# Patient Record
Sex: FEMALE | Race: ASIAN | NOT HISPANIC OR LATINO | URBAN - METROPOLITAN AREA
[De-identification: names, ages, dates, MRNs, and addresses within clinical notes are randomized per-mention and may not be internally consistent; named-entity substitution may affect disease eponyms.]

---

## 2019-02-06 ENCOUNTER — INPATIENT (INPATIENT)
Facility: HOSPITAL | Age: 84
LOS: 0 days | Discharge: HOME | DRG: 690 | End: 2019-02-07
Attending: INTERNAL MEDICINE | Admitting: INTERNAL MEDICINE
Payer: COMMERCIAL

## 2019-02-06 VITALS
TEMPERATURE: 99 F | WEIGHT: 139.99 LBS | SYSTOLIC BLOOD PRESSURE: 149 MMHG | RESPIRATION RATE: 16 BRPM | OXYGEN SATURATION: 97 % | HEART RATE: 87 BPM | DIASTOLIC BLOOD PRESSURE: 81 MMHG

## 2019-02-06 LAB
ALBUMIN SERPL ELPH-MCNC: 4.2 G/DL — SIGNIFICANT CHANGE UP (ref 3.3–5)
ALP SERPL-CCNC: 45 U/L — SIGNIFICANT CHANGE UP (ref 40–120)
ALT FLD-CCNC: 13 U/L — SIGNIFICANT CHANGE UP (ref 10–45)
ANION GAP SERPL CALC-SCNC: 12 MMOL/L — SIGNIFICANT CHANGE UP (ref 5–17)
APPEARANCE UR: CLEAR — SIGNIFICANT CHANGE UP
AST SERPL-CCNC: 15 U/L — SIGNIFICANT CHANGE UP (ref 10–40)
BASOPHILS # BLD AUTO: 0.04 K/UL — SIGNIFICANT CHANGE UP (ref 0–0.2)
BASOPHILS NFR BLD AUTO: 0.5 % — SIGNIFICANT CHANGE UP (ref 0–2)
BILIRUB SERPL-MCNC: 0.2 MG/DL — SIGNIFICANT CHANGE UP (ref 0.2–1.2)
BILIRUB UR-MCNC: NEGATIVE — SIGNIFICANT CHANGE UP
BUN SERPL-MCNC: 21 MG/DL — SIGNIFICANT CHANGE UP (ref 7–23)
CALCIUM SERPL-MCNC: 9.1 MG/DL — SIGNIFICANT CHANGE UP (ref 8.4–10.5)
CHLORIDE SERPL-SCNC: 107 MMOL/L — SIGNIFICANT CHANGE UP (ref 96–108)
CO2 SERPL-SCNC: 24 MMOL/L — SIGNIFICANT CHANGE UP (ref 22–31)
COLOR SPEC: YELLOW — SIGNIFICANT CHANGE UP
CREAT SERPL-MCNC: 0.81 MG/DL — SIGNIFICANT CHANGE UP (ref 0.5–1.3)
DIFF PNL FLD: NEGATIVE — SIGNIFICANT CHANGE UP
EOSINOPHIL # BLD AUTO: 0.07 K/UL — SIGNIFICANT CHANGE UP (ref 0–0.5)
EOSINOPHIL NFR BLD AUTO: 0.9 % — SIGNIFICANT CHANGE UP (ref 0–6)
GLUCOSE SERPL-MCNC: 115 MG/DL — HIGH (ref 70–99)
GLUCOSE UR QL: NEGATIVE — SIGNIFICANT CHANGE UP
HCT VFR BLD CALC: 36.4 % — SIGNIFICANT CHANGE UP (ref 34.5–45)
HGB BLD-MCNC: 11.7 G/DL — SIGNIFICANT CHANGE UP (ref 11.5–15.5)
IMM GRANULOCYTES NFR BLD AUTO: 0.4 % — SIGNIFICANT CHANGE UP (ref 0–1.5)
KETONES UR-MCNC: NEGATIVE — SIGNIFICANT CHANGE UP
LEUKOCYTE ESTERASE UR-ACNC: ABNORMAL
LYMPHOCYTES # BLD AUTO: 1.02 K/UL — SIGNIFICANT CHANGE UP (ref 1–3.3)
LYMPHOCYTES # BLD AUTO: 12.8 % — LOW (ref 13–44)
MCHC RBC-ENTMCNC: 32.1 GM/DL — SIGNIFICANT CHANGE UP (ref 32–36)
MCHC RBC-ENTMCNC: 34.8 PG — HIGH (ref 27–34)
MCV RBC AUTO: 108.3 FL — HIGH (ref 80–100)
MONOCYTES # BLD AUTO: 0.65 K/UL — SIGNIFICANT CHANGE UP (ref 0–0.9)
MONOCYTES NFR BLD AUTO: 8.2 % — SIGNIFICANT CHANGE UP (ref 2–14)
NEUTROPHILS # BLD AUTO: 6.15 K/UL — SIGNIFICANT CHANGE UP (ref 1.8–7.4)
NEUTROPHILS NFR BLD AUTO: 77.2 % — HIGH (ref 43–77)
NITRITE UR-MCNC: NEGATIVE — SIGNIFICANT CHANGE UP
PH UR: 7 — SIGNIFICANT CHANGE UP (ref 5–8)
PLATELET # BLD AUTO: 195 K/UL — SIGNIFICANT CHANGE UP (ref 150–400)
POTASSIUM SERPL-MCNC: 5.2 MMOL/L — SIGNIFICANT CHANGE UP (ref 3.5–5.3)
POTASSIUM SERPL-SCNC: 5.2 MMOL/L — SIGNIFICANT CHANGE UP (ref 3.5–5.3)
PROT SERPL-MCNC: 7.1 G/DL — SIGNIFICANT CHANGE UP (ref 6–8.3)
PROT UR-MCNC: NEGATIVE MG/DL — SIGNIFICANT CHANGE UP
RBC # BLD: 3.36 M/UL — LOW (ref 3.8–5.2)
RBC # FLD: 12.4 % — SIGNIFICANT CHANGE UP (ref 10.3–14.5)
SODIUM SERPL-SCNC: 143 MMOL/L — SIGNIFICANT CHANGE UP (ref 135–145)
SP GR SPEC: 1.01 — SIGNIFICANT CHANGE UP (ref 1–1.03)
UROBILINOGEN FLD QL: 0.2 E.U./DL — SIGNIFICANT CHANGE UP
WBC # BLD: 7.96 K/UL — SIGNIFICANT CHANGE UP (ref 3.8–10.5)
WBC # FLD AUTO: 7.96 K/UL — SIGNIFICANT CHANGE UP (ref 3.8–10.5)

## 2019-02-06 PROCEDURE — 99285 EMERGENCY DEPT VISIT HI MDM: CPT

## 2019-02-06 PROCEDURE — 74177 CT ABD & PELVIS W/CONTRAST: CPT | Mod: 26

## 2019-02-06 RX ORDER — ONDANSETRON 8 MG/1
4 TABLET, FILM COATED ORAL ONCE
Qty: 0 | Refills: 0 | Status: COMPLETED | OUTPATIENT
Start: 2019-02-06 | End: 2019-02-06

## 2019-02-06 RX ORDER — SODIUM CHLORIDE 9 MG/ML
1000 INJECTION INTRAMUSCULAR; INTRAVENOUS; SUBCUTANEOUS ONCE
Qty: 0 | Refills: 0 | Status: COMPLETED | OUTPATIENT
Start: 2019-02-06 | End: 2019-02-06

## 2019-02-06 RX ORDER — FAMOTIDINE 10 MG/ML
20 INJECTION INTRAVENOUS ONCE
Qty: 0 | Refills: 0 | Status: COMPLETED | OUTPATIENT
Start: 2019-02-06 | End: 2019-02-06

## 2019-02-06 RX ORDER — IOHEXOL 300 MG/ML
30 INJECTION, SOLUTION INTRAVENOUS ONCE
Qty: 0 | Refills: 0 | Status: COMPLETED | OUTPATIENT
Start: 2019-02-06 | End: 2019-02-06

## 2019-02-06 RX ORDER — KETOROLAC TROMETHAMINE 30 MG/ML
15 SYRINGE (ML) INJECTION ONCE
Qty: 0 | Refills: 0 | Status: DISCONTINUED | OUTPATIENT
Start: 2019-02-06 | End: 2019-02-06

## 2019-02-06 RX ADMIN — SODIUM CHLORIDE 1000 MILLILITER(S): 9 INJECTION INTRAMUSCULAR; INTRAVENOUS; SUBCUTANEOUS at 20:43

## 2019-02-06 RX ADMIN — IOHEXOL 30 MILLILITER(S): 300 INJECTION, SOLUTION INTRAVENOUS at 18:49

## 2019-02-06 RX ADMIN — Medication 15 MILLIGRAM(S): at 20:43

## 2019-02-06 RX ADMIN — Medication 15 MILLIGRAM(S): at 18:49

## 2019-02-06 RX ADMIN — FAMOTIDINE 20 MILLIGRAM(S): 10 INJECTION INTRAVENOUS at 18:49

## 2019-02-06 RX ADMIN — ONDANSETRON 4 MILLIGRAM(S): 8 TABLET, FILM COATED ORAL at 18:49

## 2019-02-06 RX ADMIN — SODIUM CHLORIDE 1000 MILLILITER(S): 9 INJECTION INTRAMUSCULAR; INTRAVENOUS; SUBCUTANEOUS at 18:49

## 2019-02-06 NOTE — CONSULT NOTE ADULT - SUBJECTIVE AND OBJECTIVE BOX
HPI:  85 F HTN, HLD, Hypothyroid PSHx R renal tumor excision, hysterectomy presents with sharp abdominal pain x2D. Pain has been constant sharp worse in epigastric x2 days not associated with food and waking her in the night once. Pain was improved with manisha-seltzer. Denies N/V, F/C, CP, SOB,. Is passing flatus with last BM today.           REVIEW OF SYSTEMS  Pertinent findings discussed above    MEDICATIONS  (PRN):      Allergies    Benadryl (Other)  Darvon (Other)  Demerol HCl (Other)  Detrol (Unknown)  Lipitor (Unknown)  oxycodone (Other)  penicillin (Anaphylaxis; Other)  shellfish (Unknown)  sulfa drugs (Hives)    Intolerances        SOCIAL HISTORY:    FAMILY HISTORY:      Vital Signs Last 24 Hrs  T(C): 37 (2019 22:32), Max: 37.1 (2019 16:53)  T(F): 98.6 (2019 22:32), Max: 98.8 (2019 16:53)  HR: 79 (2019 22:32) (77 - 87)  BP: 132/74 (2019 22:32) (132/74 - 149/81)  BP(mean): --  RR: 16 (2019 22:32) (16 - 16)  SpO2: 97% (2019 22:32) (97% - 97%)    PHYSICAL EXAM:  General: A/O x4 NAD  ABD: Obese, softly distended        LABS:                        11.7   7.96  )-----------( 195      ( 2019 18:02 )             36.4     02-06    143  |  107  |  21  ----------------------------<  115<H>  5.2   |  24  |  0.81    Ca    9.1      2019 18:02    TPro  7.1  /  Alb  4.2  /  TBili  0.2  /  DBili  x   /  AST  15  /  ALT  13  /  AlkPhos  45  02-06      Urinalysis Basic - ( 2019 20:48 )    Color: Yellow / Appearance: Clear / S.015 / pH: x  Gluc: x / Ketone: NEGATIVE  / Bili: Negative / Urobili: 0.2 E.U./dL   Blood: x / Protein: NEGATIVE mg/dL / Nitrite: NEGATIVE   Leuk Esterase: Small / RBC: < 5 /HPF / WBC > 10 /HPF   Sq Epi: x / Non Sq Epi: 0-5 /HPF / Bacteria: Present /HPF        RADIOLOGY & ADDITIONAL STUDIES:

## 2019-02-06 NOTE — ED ADULT TRIAGE NOTE - CHIEF COMPLAINT QUOTE
Pt referred to ED by Dr Lawton with c/o generalized sharp abdominal pain and nausea x two days. Denies diarrhea, fevers, chills.

## 2019-02-06 NOTE — CONSULT NOTE ADULT - ASSESSMENT
85 F HTN, HLD, Hypothyroid PSHx R renal tumor excision, hysterectomy presents with sharp abdominal pain x2D. CT shows Small fat containing umbilical hernia and circumferential thickening of antrum    Recommend O/P GI follow up.   Can see Dr Lopez in his clinic with in 1 week  Discussed with Dr Lopez

## 2019-02-06 NOTE — ED ADULT NURSE NOTE - NSIMPLEMENTINTERV_GEN_ALL_ED
Implemented All Universal Safety Interventions:  [place] to call system. Call bell, personal items and telephone within reach. Instruct patient to call for assistance. Room bathroom lighting operational. Non-slip footwear when patient is off stretcher. Physically safe environment: no spills, clutter or unnecessary equipment. Stretcher in lowest position, wheels locked, appropriate side rails in place.

## 2019-02-06 NOTE — ED PROVIDER NOTE - PHYSICAL EXAMINATION
VITAL SIGNS: I have reviewed nursing notes and confirm.  CONSTITUTIONAL: Well-developed; well-nourished elderly female, talkative in stretcher, A&OX3 speaking clearly in complete sentences, no acute distress.  SKIN: Agree with RN documentation regarding decubitus evaluation. Remainder of skin exam is warm and dry, no acute rash.  HEAD: Normocephalic; atraumatic.  EYES: PERRL, EOM intact; conjunctiva and sclera clear.  ENT: No nasal discharge; airway clear.  NECK: Supple; non tender.  CARD: S1, S2 normal; no murmurs, gallops, or rubs. RRR  RESP: No wheezes, rales or rhonchi. CTA w/good excursion, no inc wob  ABD: Normal bowel sounds; soft; + TTP diffusely, no rigidity/guarding  : No CVA TTP b/l  EXT: Normal ROM. No clubbing, cyanosis or edema.  LYMPH: No acute cervical adenopathy.  NEURO: Alert, oriented. Grossly unremarkable.  PSYCH: Cooperative, appropriate.

## 2019-02-06 NOTE — ED PROVIDER NOTE - OBJECTIVE STATEMENT
86 y/o F w/pshx total hysterectomy, partial R sided nephrectomy for renal ca, p/w diffuse sharp generalized abd pain worsening for past 2 days w/nausea, no vomiting. Normal BMs, no brbpr or melena. No fever/chills. No CP/SOB/palpitations. Denies association with eating. No hx gallstones. No urinary sx or back/flank pain. Never had similar pain in the past. Went to see PMD Dr. Lawton in the office, found to be diffusely TTP, referred to ED for evaluation.

## 2019-02-06 NOTE — ED ADULT NURSE NOTE - CHPI ED NUR SYMPTOMS NEG
no blood in stool/no burning urination/no diarrhea/no vomiting/no abdominal distension/no dysuria/no chills

## 2019-02-06 NOTE — ED PROVIDER NOTE - MEDICAL DECISION MAKING DETAILS
diffuse abd pain w/ttp, unclear etiology, CT not reflective of pt's clinical impression, seen by surgery, no surgical intervention suggested at this time, will admit to Dr. Lawton's service for sx management, IV hydration, and to be seen by GI for possible scope.

## 2019-02-07 ENCOUNTER — TRANSCRIPTION ENCOUNTER (OUTPATIENT)
Age: 84
End: 2019-02-07

## 2019-02-07 VITALS
TEMPERATURE: 98 F | DIASTOLIC BLOOD PRESSURE: 73 MMHG | SYSTOLIC BLOOD PRESSURE: 139 MMHG | RESPIRATION RATE: 18 BRPM | OXYGEN SATURATION: 98 % | HEART RATE: 72 BPM

## 2019-02-07 DIAGNOSIS — R10.84 GENERALIZED ABDOMINAL PAIN: ICD-10-CM

## 2019-02-07 DIAGNOSIS — Z91.89 OTHER SPECIFIED PERSONAL RISK FACTORS, NOT ELSEWHERE CLASSIFIED: ICD-10-CM

## 2019-02-07 DIAGNOSIS — Z90.710 ACQUIRED ABSENCE OF BOTH CERVIX AND UTERUS: Chronic | ICD-10-CM

## 2019-02-07 DIAGNOSIS — R63.8 OTHER SYMPTOMS AND SIGNS CONCERNING FOOD AND FLUID INTAKE: ICD-10-CM

## 2019-02-07 PROCEDURE — 96361 HYDRATE IV INFUSION ADD-ON: CPT

## 2019-02-07 PROCEDURE — 36415 COLL VENOUS BLD VENIPUNCTURE: CPT

## 2019-02-07 PROCEDURE — 80053 COMPREHEN METABOLIC PANEL: CPT

## 2019-02-07 PROCEDURE — 74177 CT ABD & PELVIS W/CONTRAST: CPT

## 2019-02-07 PROCEDURE — 99285 EMERGENCY DEPT VISIT HI MDM: CPT | Mod: 25

## 2019-02-07 PROCEDURE — 96374 THER/PROPH/DIAG INJ IV PUSH: CPT

## 2019-02-07 PROCEDURE — 81001 URINALYSIS AUTO W/SCOPE: CPT

## 2019-02-07 PROCEDURE — 96375 TX/PRO/DX INJ NEW DRUG ADDON: CPT

## 2019-02-07 PROCEDURE — 83690 ASSAY OF LIPASE: CPT

## 2019-02-07 PROCEDURE — 85025 COMPLETE CBC W/AUTO DIFF WBC: CPT

## 2019-02-07 RX ORDER — SIMVASTATIN 20 MG/1
1 TABLET, FILM COATED ORAL
Qty: 0 | Refills: 0 | COMMUNITY
Start: 2019-02-07

## 2019-02-07 RX ORDER — SIMETHICONE 80 MG/1
1 TABLET, CHEWABLE ORAL
Qty: 90 | Refills: 0 | OUTPATIENT
Start: 2019-02-07 | End: 2019-03-08

## 2019-02-07 RX ORDER — LEVOTHYROXINE SODIUM 125 MCG
1 TABLET ORAL
Qty: 0 | Refills: 0 | COMMUNITY
Start: 2019-02-07

## 2019-02-07 RX ORDER — DICLOFENAC SODIUM 75 MG/1
1 TABLET, DELAYED RELEASE ORAL
Qty: 0 | Refills: 0 | COMMUNITY

## 2019-02-07 RX ORDER — LOSARTAN POTASSIUM 100 MG/1
1 TABLET, FILM COATED ORAL
Qty: 0 | Refills: 0 | COMMUNITY
Start: 2019-02-07

## 2019-02-07 RX ORDER — SIMETHICONE 80 MG/1
80 TABLET, CHEWABLE ORAL ONCE
Qty: 0 | Refills: 0 | Status: COMPLETED | OUTPATIENT
Start: 2019-02-07 | End: 2019-02-07

## 2019-02-07 RX ORDER — SIMETHICONE 80 MG/1
80 TABLET, CHEWABLE ORAL EVERY 8 HOURS
Qty: 0 | Refills: 0 | Status: DISCONTINUED | OUTPATIENT
Start: 2019-02-07 | End: 2019-02-07

## 2019-02-07 RX ORDER — DOCUSATE SODIUM 100 MG
1 CAPSULE ORAL
Qty: 30 | Refills: 0 | OUTPATIENT
Start: 2019-02-07 | End: 2019-02-21

## 2019-02-07 RX ORDER — LEVOTHYROXINE SODIUM 125 MCG
112 TABLET ORAL DAILY
Qty: 0 | Refills: 0 | Status: DISCONTINUED | OUTPATIENT
Start: 2019-02-07 | End: 2019-02-07

## 2019-02-07 RX ORDER — POLYETHYLENE GLYCOL 3350 17 G/17G
17 POWDER, FOR SOLUTION ORAL DAILY
Qty: 0 | Refills: 0 | Status: DISCONTINUED | OUTPATIENT
Start: 2019-02-07 | End: 2019-02-07

## 2019-02-07 RX ORDER — CEFPODOXIME PROXETIL 100 MG
1 TABLET ORAL
Qty: 12 | Refills: 0 | OUTPATIENT
Start: 2019-02-07 | End: 2019-02-12

## 2019-02-07 RX ORDER — DOCUSATE SODIUM 100 MG
100 CAPSULE ORAL
Qty: 0 | Refills: 0 | Status: DISCONTINUED | OUTPATIENT
Start: 2019-02-07 | End: 2019-02-07

## 2019-02-07 RX ORDER — LOSARTAN POTASSIUM 100 MG/1
50 TABLET, FILM COATED ORAL DAILY
Qty: 0 | Refills: 0 | Status: DISCONTINUED | OUTPATIENT
Start: 2019-02-07 | End: 2019-02-07

## 2019-02-07 RX ORDER — CEFTRIAXONE 500 MG/1
1 INJECTION, POWDER, FOR SOLUTION INTRAMUSCULAR; INTRAVENOUS EVERY 24 HOURS
Qty: 0 | Refills: 0 | Status: DISCONTINUED | OUTPATIENT
Start: 2019-02-07 | End: 2019-02-07

## 2019-02-07 RX ORDER — POLYETHYLENE GLYCOL 3350 17 G/17G
17 POWDER, FOR SOLUTION ORAL
Qty: 500 | Refills: 0 | OUTPATIENT
Start: 2019-02-07 | End: 2019-02-21

## 2019-02-07 RX ORDER — SIMVASTATIN 20 MG/1
20 TABLET, FILM COATED ORAL AT BEDTIME
Qty: 0 | Refills: 0 | Status: DISCONTINUED | OUTPATIENT
Start: 2019-02-07 | End: 2019-02-07

## 2019-02-07 RX ORDER — MIRABEGRON 50 MG/1
1 TABLET, EXTENDED RELEASE ORAL
Qty: 0 | Refills: 0 | COMMUNITY

## 2019-02-07 RX ADMIN — Medication 100 MILLIGRAM(S): at 06:00

## 2019-02-07 RX ADMIN — CEFTRIAXONE 100 GRAM(S): 500 INJECTION, POWDER, FOR SOLUTION INTRAMUSCULAR; INTRAVENOUS at 07:06

## 2019-02-07 RX ADMIN — POLYETHYLENE GLYCOL 3350 17 GRAM(S): 17 POWDER, FOR SOLUTION ORAL at 12:43

## 2019-02-07 RX ADMIN — Medication 112 MICROGRAM(S): at 07:37

## 2019-02-07 RX ADMIN — LOSARTAN POTASSIUM 50 MILLIGRAM(S): 100 TABLET, FILM COATED ORAL at 07:07

## 2019-02-07 RX ADMIN — POLYETHYLENE GLYCOL 3350 17 GRAM(S): 17 POWDER, FOR SOLUTION ORAL at 01:48

## 2019-02-07 RX ADMIN — SIMETHICONE 80 MILLIGRAM(S): 80 TABLET, CHEWABLE ORAL at 01:49

## 2019-02-07 NOTE — CONSULT NOTE ADULT - SUBJECTIVE AND OBJECTIVE BOX
:  Patient is an 85 year old female with past medical history of total hysterectomy, renal carcinoma (right, in remission) who presents with acute onset diffuse abdominal pain startin Feb. 4 2019.     Patient explains that pain started acutely as a sharp diffuse  pain that radiated over her entire abdomen. At its worst, pain was 9/10, relieved by manisha seltzer suggested by .  She denies diarrhea nor constipation and has not been taking narcotics nor NSAIDs. Since pain failed to resolve although it eased a little it was recommended she came to ED.  Last colonoscopy was 3 years ago and negative per patient. . CT abdomen done  with nonspecific wall thickening in the gastric antrum.  Feels better but still some discomfort    REVIEW OF SYSTEMS:  Constitutional: No fever, weight loss or fatigue  ENMT:  No difficulty hearing, tinnitus, vertigo; No sinus or throat pain  Respiratory: No cough, wheezing, chills or hemoptysis  Cardiovascular: No chest pain, palpitations, dizziness or leg swelling  Gastrointestinal: less abdominal  pain. No nausea, vomiting or hematemesis; No diarrhea or constipation. No melena or hematochezia.  Skin: No itching, burning, rashes or lesions   Musculoskeletal: No joint pain or swelling; No muscle, back or extremity pain    PAST MEDICAL & SURGICAL HISTORY:  Fibroids  Renal carcinoma, right  H/O abdominal hysterectomy      FAMILY HISTORY:  No family history of cardiovascular disease (Father, Mother)      SOCIAL HISTORY:  Smoking Status: [ ] Current, [ ] Former, [ ] Never  Pack Years:    MEDICATIONS:  MEDICATIONS  (STANDING):  cefTRIAXone   IVPB 1 Gram(s) IV Intermittent every 24 hours  docusate sodium 100 milliGRAM(s) Oral two times a day  levothyroxine 112 MICROGram(s) Oral daily  losartan 50 milliGRAM(s) Oral daily  polyethylene glycol 3350 17 Gram(s) Oral daily  simvastatin 20 milliGRAM(s) Oral at bedtime    MEDICATIONS  (PRN):  simethicone 80 milliGRAM(s) Chew every 8 hours PRN Gas      Allergies    Benadryl (Other)  Darvon (Other)  Demerol HCl (Other)  Detrol (Unknown)  Lipitor (Unknown)  oxycodone (Other)  penicillin (Anaphylaxis; Other)  shellfish (Unknown)  sulfa drugs (Hives)    Intolerances        Vital Signs Last 24 Hrs  T(C): 36.3 (07 Feb 2019 09:24), Max: 37.2 (07 Feb 2019 01:13)  T(F): 97.4 (07 Feb 2019 09:24), Max: 99 (07 Feb 2019 01:13)  HR: 75 (07 Feb 2019 09:24) (75 - 90)  BP: 148/73 (07 Feb 2019 09:24) (132/74 - 159/77)  BP(mean): --  RR: 17 (07 Feb 2019 09:24) (16 - 17)  SpO2: 97% (07 Feb 2019 09:24) (76% - 97%)        PHYSICAL EXAM:    General: Well developed; well nourished; in no acute distress  HEENT: MMM, conjunctiva and sclera clear  Gastrointestinal: Soft, corpulent non-tender non-distended; Normal bowel sounds; No rebound or guarding  Extremities: Normal range of motion, No clubbing, cyanosis or edema  Neurological: Alert and oriented x3  Skin: Warm and dry. No obvious rash      LABS:                        11.7   7.96  )-----------( 195      ( 06 Feb 2019 18:02 )             36.4     02-06    143  |  107  |  21  ----------------------------<  115<H>  5.2   |  24  |  0.81    Ca    9.1      06 Feb 2019 18:02    TPro  7.1  /  Alb  4.2  /  TBili  0.2  /  DBili  x   /  AST  15  /  ALT  13  /  AlkPhos  45  02-06          RADIOLOGY & ADDITIONAL STUDIES:

## 2019-02-07 NOTE — DISCHARGE NOTE ADULT - OTHER SIGNIFICANT FINDINGS
< from: CT Abdomen and Pelvis w/ Oral Cont and w/ IV Cont (02.06.19 @ 21:16) >  IMPRESSION: Circumferential wall thickening of the gastric antrum. Direct   inspection may be of value.      < end of copied text >

## 2019-02-07 NOTE — DISCHARGE NOTE ADULT - PATIENT PORTAL LINK FT
You can access the [email or web address] Patient Portal, offered by [facility], by registering with the following website: [email or web address]

## 2019-02-07 NOTE — DISCHARGE NOTE ADULT - CARE PLAN
Principal Discharge DX:	Generalized abdominal pain  Secondary Diagnosis:	Hyperlipidemia  Secondary Diagnosis:	Hyperthyroidism Principal Discharge DX:	Generalized abdominal pain  Goal:	Continue to eat your normal diet as tolerated and please follow up with Dr Dumont as an outpatient.  Assessment and plan of treatment:	You presented to the ED with complaint of abdominal pain that was causing you severe discomfort. You underwent imaging of the abdomen that did not uncover any specific cause. All that was found was a thickening of a part of your stomach, which may be an incidental finding. Your abdominal pain resolved with supportive care. You were seen in the hospital by Dr Dumont, who noted vast improvement in your abdominal pain. You tolerated food and were far more comfortable the next day. On discharge, we would like you to continue eating your normal diet as tolerated, taking care to monitor for any abdominal pain. Should you experience any sharp belly pain or notice any blood in your stool, please return to the Emergency Room. Please follow up with Dr Dumont as an outpatient - he would like to start work up for an endoscopy to evaluate your gastrointestinal tract.  Secondary Diagnosis:	UTI (urinary tract infection)  Goal:	Please continue to take antibiotics as prescribed - please continue the medication as detailed below for another 2 days to complete a total of 3 days of treatment.  Assessment and plan of treatment:	You were found to have a urinary tract infection, and, in the setting of abdominal pain, this warranted antibiotic treatment. You received one day of intravenous antibiotics. You will continue with oral antibiotics for another 2 days. Please continue taking Cefpodoxime  Secondary Diagnosis:	Hypothyroidism  Secondary Diagnosis:	Hypertension  Secondary Diagnosis:	Hyperlipidemia Principal Discharge DX:	Generalized abdominal pain  Goal:	Continue to eat your normal diet as tolerated and please follow up with Dr Dumont as an outpatient.  Assessment and plan of treatment:	You presented to the ED with complaint of abdominal pain that was causing you severe discomfort. You underwent imaging of the abdomen that did not uncover any specific cause. All that was found was a thickening of a part of your stomach, which may be an incidental finding. Your abdominal pain resolved with supportive care. You were seen in the hospital by Dr Dumont, who noted vast improvement in your abdominal pain. You tolerated food and were far more comfortable the next day. On discharge, we would like you to continue eating your normal diet as tolerated, taking care to monitor for any abdominal pain. Should you experience any sharp belly pain or notice any blood in your stool, please return to the Emergency Room. Please follow up with Dr Dumont as an outpatient - he would like to start work up for an endoscopy to evaluate your gastrointestinal tract.  Secondary Diagnosis:	UTI (urinary tract infection)  Goal:	Please continue to take antibiotics as prescribed - please continue the medication as detailed below for another 4 days to complete a total of 3 days of treatment.  Assessment and plan of treatment:	You were found to have a urinary tract infection, and, in the setting of abdominal pain, this warranted antibiotic treatment. You received one day of intravenous antibiotics. You will continue with oral antibiotics for another 2 days. Please continue taking Cefpodoxime 100mg twice daily for another 4 days.  Secondary Diagnosis:	Hypothyroidism  Goal:	Please continue your home medication, Synthroid, 112mcg once daily in the morning on an empty stomach.  Secondary Diagnosis:	Hypertension  Goal:	Please continue your home medication, Losartan, 50mg once daily.  Secondary Diagnosis:	Hyperlipidemia Principal Discharge DX:	Generalized abdominal pain  Goal:	Continue to eat your normal diet as tolerated and please follow up with Dr Dumont as an outpatient.  Assessment and plan of treatment:	You presented to the ED with complaint of abdominal pain that was causing you severe discomfort. You underwent imaging of the abdomen that did not uncover any specific cause. All that was found was a thickening of a part of your stomach, which may be an incidental finding. Your abdominal pain resolved with supportive care. You were seen in the hospital by Dr Dumont, who noted vast improvement in your abdominal pain. You tolerated food and were far more comfortable the next day. On discharge, we would like you to continue eating your normal diet as tolerated, taking care to monitor for any abdominal pain. Should you experience any sharp belly pain or notice any blood in your stool, please return to the Emergency Room. Please follow up with Dr Dumont as an outpatient - he would like to start work up for an endoscopy to evaluate your gastrointestinal tract. Please continue taking simethicone (GasX) if you continue to experience gas pain, as well as Miralax and colace if you have constipation.  Secondary Diagnosis:	UTI (urinary tract infection)  Goal:	Please continue to take antibiotics as prescribed - please continue the medication as detailed below for another 4 days to complete a total of 3 days of treatment.  Assessment and plan of treatment:	You were found to have a urinary tract infection, and, in the setting of abdominal pain, this warranted antibiotic treatment. You received one day of intravenous antibiotics. You will continue with oral antibiotics  when you leave the hospital. Please continue taking Cefpodoxime 100mg twice daily for another 6 days.  Secondary Diagnosis:	Hypothyroidism  Goal:	Please continue your home medication, Synthroid, 112mcg once daily in the morning on an empty stomach.  Secondary Diagnosis:	Hypertension  Goal:	Please continue your home medication, Losartan, 50mg once daily.  Secondary Diagnosis:	Hyperlipidemia  Assessment and plan of treatment:	Please continue your home simvastatin 20mg once daily.

## 2019-02-07 NOTE — DISCHARGE NOTE ADULT - HOSPITAL COURSE
85 year old female with past medical history of total hysterectomy, renal carcinoma (right, in remission), and hypothyroidism presenting with sharp, generalized abdominal pain. Patient underwent CT abdomen which illustrated nonspecific wall thickening in the gastric antrum. Seen by surgery; no intervention, suggested GI evaluation. Dr Dumont evaluated the patient the next morning when pain had largely resolved. Though pain was still present, it was minimal. Patient was able to tolerate her meal without significant abdominal pain, which was re-assuring. Initially, patient was planned for possible EGD, but this will be done outpatient with Dr Dumont.

## 2019-02-07 NOTE — H&P ADULT - PROBLEM SELECTOR PLAN 1
CT abdomen with thickening of gastric antrum; no intervention per surgery. Pain from 9/10 to 0/10 at time of interview with pain to deep palpation in lower quadrants. Per interview, sounds like bloating with gas pains in setting of constipation. Low concern for SBO given continuing to stool, low suspicion for colitis at patients age and without blood in stool, no obstructive pathology observed on imaging. No nausea/vomiting. Post menopausal.   -bowel regimen  -simethicone PRN  -treatment of bacteriuria  -consult GI for scope tomorrow for thickening of gastric antrum    #UTI  -CTX x 3 days CT abdomen with thickening of gastric antrum; no intervention per surgery. Pain from 9/10 to 0/10 at time of interview with pain to deep palpation in lower quadrants. Per interview, sounds like bloating with gas pains in setting of constipation. Low concern for SBO given continuing to stool, low suspicion for colitis at patients age and without blood in stool, no obstructive pathology observed on imaging. No nausea/vomiting. Post menopausal.   -bowel regimen  -simethicone PRN  -treatment of bacteriuria  -consult GI for scope tomorrow for thickening of gastric antrum    #UTI  May be contributing to pain, given unclear source at this time, worth treating.  -CTX x 3 days

## 2019-02-07 NOTE — H&P ADULT - NSHPPHYSICALEXAM_GEN_ALL_CORE
VITAL SIGNS:  T(C): 37.2 (02-07-19 @ 01:13), Max: 37.2 (02-07-19 @ 01:13)  T(F): 99 (02-07-19 @ 01:13), Max: 99 (02-07-19 @ 01:13)  HR: 90 (02-07-19 @ 01:13) (77 - 90)  BP: 159/77 (02-07-19 @ 01:13) (132/74 - 159/77)  RR: 16 (02-07-19 @ 01:13) (16 - 16)  SpO2: 76% (02-07-19 @ 01:13) (76% - 97%)    PHYSICAL EXAM:  Constitutional: WDWN resting comfortably in bed; NAD   Head: NC/AT  Eyes: PERRL, EOMI, anicteric sclera  ENT: no nasal discharge; uvula midline, no oropharyngeal erythema or exudates; MMM  Neck: supple; no JVD or thyromegaly  Respiratory: CTA B/L; no W/R/R, no retractions  Cardiac: +S1/S2; RRR; no M/R/G; PMI non-displaced  Gastrointestinal: soft, no guarding or rebound tenderness, discomfort with deep palpation greater in lower quadrants than upper, normal bowel sounds  Extremities: WWP, no clubbing or cyanosis; no peripheral edema  Vascular: 2+ radial, femoral, DP/PT pulses B/L  Dermatologic: skin warm, dry and intact; no rashes, wounds, or scars  Neurologic: AAOx3; CNII-XII grossly intact; no focal deficits  Psychiatric: affect and characteristics of appearance, verbalizations, behaviors are appropriate

## 2019-02-07 NOTE — DISCHARGE NOTE ADULT - MEDICATION SUMMARY - MEDICATIONS TO TAKE
I will START or STAY ON the medications listed below when I get home from the hospital:    diclofenac sodium 75 mg oral delayed release tablet  -- 1 tab(s) by mouth 2 times a day, As Needed  -- Indication: For Pain    losartan 50 mg oral tablet  -- 1 tab(s) by mouth once a day  -- Indication: For Hypertension    simvastatin 20 mg oral tablet  -- 1 tab(s) by mouth once a day (at bedtime)  -- Indication: For Hyperlipidemia    cefpodoxime 100 mg oral tablet  -- 1 tab(s) by mouth every 12 hours   -- Finish all this medication unless otherwise directed by prescriber.  Take with food or milk.    -- Indication: For UTI (urinary tract infection)    docusate sodium 100 mg oral capsule  -- 1 cap(s) by mouth 2 times a day, As Needed -for constipation   -- Indication: For Constipation    polyethylene glycol 3350 oral powder for reconstitution  -- 17 gram(s) by mouth once a day, As Needed -for constipation   -- Indication: For Constipation    simethicone 80 mg oral tablet, chewable  -- 1 tab(s) by mouth every 8 hours, As needed, Gas  -- Indication: For Gas discomfort    levothyroxine 112 mcg (0.112 mg) oral tablet  -- 1 tab(s) by mouth once a day  -- Indication: For Hypothyroidism    Myrbetriq 50 mg oral tablet, extended release  -- 1 tab(s) by mouth once a day  -- Indication: For Bladder spasms

## 2019-02-07 NOTE — H&P ADULT - NSHPLABSRESULTS_GEN_ALL_CORE
.  LABS:                         11.7   7.96  )-----------( 195      ( 2019 18:02 )             36.4     02-06    143  |  107  |  21  ----------------------------<  115<H>  5.2   |  24  |  0.81    Ca    9.1      2019 18:02    TPro  7.1  /  Alb  4.2  /  TBili  0.2  /  DBili  x   /  AST  15  /  ALT  13  /  AlkPhos  45  02-06      Urinalysis Basic - ( 2019 20:48 )    Color: Yellow / Appearance: Clear / S.015 / pH: x  Gluc: x / Ketone: NEGATIVE  / Bili: Negative / Urobili: 0.2 E.U./dL   Blood: x / Protein: NEGATIVE mg/dL / Nitrite: NEGATIVE   Leuk Esterase: Small / RBC: < 5 /HPF / WBC > 10 /HPF   Sq Epi: x / Non Sq Epi: 0-5 /HPF / Bacteria: Present /HPF      RADIOLOGY, EKG & ADDITIONAL TESTS: Reviewed. LABS:                         11.7   7.96  )-----------( 195      ( 2019 18:02 )             36.4     02-06    143  |  107  |  21  ----------------------------<  115<H>  5.2   |  24  |  0.81    Ca    9.1      2019 18:02    TPro  7.1  /  Alb  4.2  /  TBili  0.2  /  DBili  x   /  AST  15  /  ALT  13  /  AlkPhos  45  02-06      Urinalysis Basic - ( 2019 20:48 )    Color: Yellow / Appearance: Clear / S.015 / pH: x  Gluc: x / Ketone: NEGATIVE  / Bili: Negative / Urobili: 0.2 E.U./dL   Blood: x / Protein: NEGATIVE mg/dL / Nitrite: NEGATIVE   Leuk Esterase: Small / RBC: < 5 /HPF / WBC > 10 /HPF   Sq Epi: x / Non Sq Epi: 0-5 /HPF / Bacteria: Present /HPF      RADIOLOGY, EKG & ADDITIONAL TESTS: Reviewed.

## 2019-02-07 NOTE — H&P ADULT - PROBLEM SELECTOR PLAN 2
F: No IVF  E: K>4, Mg>2  N: Regular diet  Ppx: n/a  Lines: peripheral  ambulation: as tolerated  code: full  dispo: regional medical floors for GI scope to further evaluation abdominal pain F: No IVF  E: K>4, Mg>2  N: Regular diet  Ppx: not applicable   Lines: peripheral  ambulation: as tolerated  code: full  dispo: regional medical floors for GI scope to further evaluation abdominal pain

## 2019-02-07 NOTE — H&P ADULT - ASSESSMENT
Patient is an 85 year old female with past medical history of total hysterectomy, renal carcinoma (right, in remission) who presents with acute onset diffuse abdominal pain x1 day.

## 2019-02-07 NOTE — DISCHARGE NOTE ADULT - CARE PROVIDER_API CALL
Eunice Lawton (MD)  Cardiovascular Disease; Internal Medicine  [address] [facility], [address]th [place] [address]  Phone: [phone]  Fax: [phone]  Follow Up Time:     Simba Dumont)  Medicine  [address], [address]  [place] [address]  Phone: [phone]  Fax: [phone]  Follow Up Time:

## 2019-02-07 NOTE — DISCHARGE NOTE ADULT - PLAN OF CARE
You presented to the ED with complaint of abdominal pain that was causing you severe discomfort. You underwent imaging of the abdomen that did not uncover any specific cause. All that was found was a thickening of a part of your stomach, which may be an incidental finding. Your abdominal pain resolved with supportive care. You were seen in the hospital by Dr Dumont, who noted vast improvement in your abdominal pain. You tolerated food and were far more comfortable the next day. On discharge, we would like you to continue eating your normal diet as tolerated, taking care to monitor for any abdominal pain. Should you experience any sharp belly pain or notice any blood in your stool, please return to the Emergency Room. Please follow up with Dr Dumont as an outpatient - he would like to start work up for an endoscopy to evaluate your gastrointestinal tract. Please continue to take antibiotics as prescribed - please continue the medication as detailed below for another 2 days to complete a total of 3 days of treatment. You were found to have a urinary tract infection, and, in the setting of abdominal pain, this warranted antibiotic treatment. You received one day of intravenous antibiotics. You will continue with oral antibiotics for another 2 days. Please continue taking Cefpodoxime Continue to eat your normal diet as tolerated and please follow up with Dr Dumont as an outpatient. Please continue your home medication, Synthroid, 112mcg once daily in the morning on an empty stomach. Please continue your home medication, Losartan, 50mg once daily. Please continue to take antibiotics as prescribed - please continue the medication as detailed below for another 4 days to complete a total of 3 days of treatment. You were found to have a urinary tract infection, and, in the setting of abdominal pain, this warranted antibiotic treatment. You received one day of intravenous antibiotics. You will continue with oral antibiotics for another 2 days. Please continue taking Cefpodoxime 100mg twice daily for another 4 days. You presented to the ED with complaint of abdominal pain that was causing you severe discomfort. You underwent imaging of the abdomen that did not uncover any specific cause. All that was found was a thickening of a part of your stomach, which may be an incidental finding. Your abdominal pain resolved with supportive care. You were seen in the hospital by Dr Dumont, who noted vast improvement in your abdominal pain. You tolerated food and were far more comfortable the next day. On discharge, we would like you to continue eating your normal diet as tolerated, taking care to monitor for any abdominal pain. Should you experience any sharp belly pain or notice any blood in your stool, please return to the Emergency Room. Please follow up with Dr Dumont as an outpatient - he would like to start work up for an endoscopy to evaluate your gastrointestinal tract. Please continue taking simethicone (GasX) if you continue to experience gas pain, as well as Miralax and colace if you have constipation. You were found to have a urinary tract infection, and, in the setting of abdominal pain, this warranted antibiotic treatment. You received one day of intravenous antibiotics. You will continue with oral antibiotics  when you leave the hospital. Please continue taking Cefpodoxime 100mg twice daily for another 6 days. Please continue your home simvastatin 20mg once daily.

## 2019-02-07 NOTE — CONSULT NOTE ADULT - ASSESSMENT
non specific abdominal pain improving:  try to feed and observe further. if continues to improve may schedule EGD electively as out patient.  Otherwise if pain worse may warrant further eval.

## 2019-02-07 NOTE — DISCHARGE NOTE ADULT - ADDITIONAL INSTRUCTIONS
Please follow up with Dr. Lawton on February 27th at 1:30pm. Please follow up with Dr. Dumont, the gastroenterologist you saw in the hospital on February 26th at 2:30pm. His office is at [facility] [address] [place] [address].

## 2019-02-07 NOTE — H&P ADULT - NSHPSOCIALHISTORY_GEN_ALL_CORE
EtOH: denies  smoking: denies  recreational drugs: denies  Lives at home with her , independent ADLs, walks with a rollator.

## 2019-02-07 NOTE — H&P ADULT - HISTORY OF PRESENT ILLNESS
Patient is an 85 year old female with past medical history of total hysterectomy, renal carcinoma (right, in remission) who presents with acute onset diffuse abdominal pain x1 day.     Patient explains that pain started as a queasy, cramp-type feeling in her belly and progressed to a sharp pain that radiated over her entire abdomen. At its worst, pain was 9/10, relieved by manisha seltzer and stooling, patient noted that she had to strain to stool, not her usual experience. No blood in sputum or stool. ROS negative for nausea, vomiting subjective fevers, chills, chest pain, headache.     On presentation to ED patient was afebrile, HR 70's, 's, saturating comfortably on room air. CT abdomen with nonspecific wall thickening in the gastric antrum. Seen by surgery; no intervention, suggested GI evaluation. Admitted for management of abdominal pain, stable with pain scaled at 0/10.

## 2019-02-13 DIAGNOSIS — E78.5 HYPERLIPIDEMIA, UNSPECIFIED: ICD-10-CM

## 2019-02-13 DIAGNOSIS — R10.84 GENERALIZED ABDOMINAL PAIN: ICD-10-CM

## 2019-02-13 DIAGNOSIS — N39.0 URINARY TRACT INFECTION, SITE NOT SPECIFIED: ICD-10-CM

## 2019-02-13 DIAGNOSIS — Z90.710 ACQUIRED ABSENCE OF BOTH CERVIX AND UTERUS: ICD-10-CM

## 2019-02-13 DIAGNOSIS — Z85.528 PERSONAL HISTORY OF OTHER MALIGNANT NEOPLASM OF KIDNEY: ICD-10-CM

## 2019-02-13 DIAGNOSIS — E03.9 HYPOTHYROIDISM, UNSPECIFIED: ICD-10-CM

## 2019-02-13 DIAGNOSIS — Z88.8 ALLERGY STATUS TO OTHER DRUGS, MEDICAMENTS AND BIOLOGICAL SUBSTANCES: ICD-10-CM

## 2019-02-13 DIAGNOSIS — Z91.013 ALLERGY TO SEAFOOD: ICD-10-CM

## 2019-02-13 DIAGNOSIS — Z88.0 ALLERGY STATUS TO PENICILLIN: ICD-10-CM

## 2019-02-13 DIAGNOSIS — I10 ESSENTIAL (PRIMARY) HYPERTENSION: ICD-10-CM

## 2019-02-13 DIAGNOSIS — Z82.49 FAMILY HISTORY OF ISCHEMIC HEART DISEASE AND OTHER DISEASES OF THE CIRCULATORY SYSTEM: ICD-10-CM

## 2023-03-08 PROBLEM — C64.1 MALIGNANT NEOPLASM OF RIGHT KIDNEY, EXCEPT RENAL PELVIS: Chronic | Status: ACTIVE | Noted: 2019-02-07

## 2023-03-08 PROBLEM — D21.9 BENIGN NEOPLASM OF CONNECTIVE AND OTHER SOFT TISSUE, UNSPECIFIED: Chronic | Status: ACTIVE | Noted: 2019-02-07

## 2023-03-13 ENCOUNTER — APPOINTMENT (OUTPATIENT)
Dept: OTOLARYNGOLOGY | Facility: CLINIC | Age: 88
End: 2023-03-13
Payer: MEDICARE

## 2023-03-13 PROCEDURE — 92557 COMPREHENSIVE HEARING TEST: CPT

## 2023-03-13 PROCEDURE — 99072 ADDL SUPL MATRL&STAF TM PHE: CPT

## 2023-03-13 PROCEDURE — 92550 TYMPANOMETRY & REFLEX THRESH: CPT | Mod: 52

## 2023-03-30 ENCOUNTER — APPOINTMENT (OUTPATIENT)
Dept: PHARMACY | Facility: CLINIC | Age: 88
End: 2023-03-30
Payer: MEDICARE

## 2023-03-30 PROCEDURE — V5010 ASSESSMENT FOR HEARING AID: CPT

## 2023-03-30 PROCEDURE — 99072 ADDL SUPL MATRL&STAF TM PHE: CPT

## 2023-05-25 ENCOUNTER — NON-APPOINTMENT (OUTPATIENT)
Age: 88
End: 2023-05-25

## 2023-06-07 ENCOUNTER — NON-APPOINTMENT (OUTPATIENT)
Age: 88
End: 2023-06-07

## 2023-06-12 ENCOUNTER — APPOINTMENT (OUTPATIENT)
Dept: OTOLARYNGOLOGY | Facility: CLINIC | Age: 88
End: 2023-06-12
Payer: COMMERCIAL

## 2023-06-12 PROCEDURE — V5261H: CUSTOM

## 2023-07-12 ENCOUNTER — APPOINTMENT (OUTPATIENT)
Dept: OTOLARYNGOLOGY | Facility: CLINIC | Age: 88
End: 2023-07-12
Payer: MEDICARE

## 2023-07-12 PROCEDURE — V5299A: CUSTOM | Mod: NC

## 2024-01-17 ENCOUNTER — APPOINTMENT (OUTPATIENT)
Dept: OTOLARYNGOLOGY | Facility: CLINIC | Age: 89
End: 2024-01-17
Payer: MEDICARE

## 2024-01-17 PROCEDURE — V5299A: CUSTOM | Mod: NC

## 2024-03-27 ENCOUNTER — APPOINTMENT (OUTPATIENT)
Dept: OTOLARYNGOLOGY | Facility: CLINIC | Age: 89
End: 2024-03-27
Payer: MEDICARE

## 2024-03-27 PROCEDURE — 92550 TYMPANOMETRY & REFLEX THRESH: CPT | Mod: 52

## 2024-03-27 PROCEDURE — 92557 COMPREHENSIVE HEARING TEST: CPT

## 2024-03-27 PROCEDURE — V5299A: CUSTOM

## 2024-07-10 ENCOUNTER — APPOINTMENT (OUTPATIENT)
Dept: OTOLARYNGOLOGY | Facility: CLINIC | Age: 89
End: 2024-07-10

## 2024-07-10 PROCEDURE — V5299A: CUSTOM

## 2024-10-28 ENCOUNTER — APPOINTMENT (OUTPATIENT)
Dept: VASCULAR SURGERY | Facility: CLINIC | Age: 89
End: 2024-10-28
Payer: MEDICARE

## 2024-10-28 VITALS
DIASTOLIC BLOOD PRESSURE: 81 MMHG | HEART RATE: 78 BPM | HEIGHT: 62 IN | BODY MASS INDEX: 22.26 KG/M2 | WEIGHT: 121 LBS | SYSTOLIC BLOOD PRESSURE: 147 MMHG

## 2024-10-28 DIAGNOSIS — R09.89 OTHER SPECIFIED SYMPTOMS AND SIGNS INVOLVING THE CIRCULATORY AND RESPIRATORY SYSTEMS: ICD-10-CM

## 2024-10-28 DIAGNOSIS — Z78.9 OTHER SPECIFIED HEALTH STATUS: ICD-10-CM

## 2024-10-28 PROCEDURE — 99244 OFF/OP CNSLTJ NEW/EST MOD 40: CPT

## 2024-10-28 PROCEDURE — 93923 UPR/LXTR ART STDY 3+ LVLS: CPT

## 2024-10-28 RX ORDER — DOCUSATE CALCIUM 240 MG
CAPSULE ORAL
Refills: 0 | Status: ACTIVE | COMMUNITY

## 2024-10-28 RX ORDER — DICLOFENAC 35 MG/1
CAPSULE ORAL
Refills: 0 | Status: ACTIVE | COMMUNITY

## 2024-10-28 RX ORDER — LEVOTHYROXINE SODIUM 200 UG/1
CAPSULE ORAL
Refills: 0 | Status: ACTIVE | COMMUNITY

## 2025-03-26 ENCOUNTER — APPOINTMENT (OUTPATIENT)
Dept: OTOLARYNGOLOGY | Facility: CLINIC | Age: 89
End: 2025-03-26

## 2025-04-10 ENCOUNTER — APPOINTMENT (OUTPATIENT)
Dept: OTOLARYNGOLOGY | Facility: CLINIC | Age: 89
End: 2025-04-10
Payer: MEDICARE

## 2025-04-10 PROCEDURE — 92550 TYMPANOMETRY & REFLEX THRESH: CPT | Mod: 52

## 2025-04-10 PROCEDURE — 92557 COMPREHENSIVE HEARING TEST: CPT

## 2025-04-10 PROCEDURE — V5299A: CUSTOM | Mod: NC

## 2025-05-21 ENCOUNTER — NON-APPOINTMENT (OUTPATIENT)
Age: 89
End: 2025-05-21

## 2025-05-21 ENCOUNTER — APPOINTMENT (OUTPATIENT)
Dept: NEUROLOGY | Facility: CLINIC | Age: 89
End: 2025-05-21
Payer: MEDICARE

## 2025-05-21 VITALS
WEIGHT: 128 LBS | BODY MASS INDEX: 23.55 KG/M2 | DIASTOLIC BLOOD PRESSURE: 70 MMHG | SYSTOLIC BLOOD PRESSURE: 156 MMHG | TEMPERATURE: 97.9 F | OXYGEN SATURATION: 98 % | HEIGHT: 62 IN | HEART RATE: 80 BPM

## 2025-05-21 DIAGNOSIS — R41.89 OTHER SYMPTOMS AND SIGNS INVOLVING COGNITIVE FUNCTIONS AND AWARENESS: ICD-10-CM

## 2025-05-21 DIAGNOSIS — Z00.00 ENCOUNTER FOR GENERAL ADULT MEDICAL EXAMINATION W/OUT ABNORMAL FINDINGS: ICD-10-CM

## 2025-05-21 DIAGNOSIS — G93.0 CEREBRAL CYSTS: ICD-10-CM

## 2025-05-21 DIAGNOSIS — G44.209 TENSION-TYPE HEADACHE, UNSPECIFIED, NOT INTRACTABLE: ICD-10-CM

## 2025-05-21 PROCEDURE — 99205 OFFICE O/P NEW HI 60 MIN: CPT
